# Patient Record
Sex: MALE | Race: WHITE | NOT HISPANIC OR LATINO | Employment: FULL TIME | URBAN - METROPOLITAN AREA
[De-identification: names, ages, dates, MRNs, and addresses within clinical notes are randomized per-mention and may not be internally consistent; named-entity substitution may affect disease eponyms.]

---

## 2022-06-29 ENCOUNTER — LAB VISIT (OUTPATIENT)
Dept: LAB | Facility: HOSPITAL | Age: 27
End: 2022-06-29
Attending: UROLOGY

## 2022-06-29 DIAGNOSIS — E29.1 TESTICULAR FAILURE: Primary | ICD-10-CM

## 2022-06-29 DIAGNOSIS — E29.1 TESTICULAR FAILURE: ICD-10-CM

## 2022-06-29 LAB
ESTRADIOL SERPL-MCNC: 41 PG/ML (ref 11–44)
FSH SERPL-ACNC: 7.56 MIU/ML (ref 0.95–11.95)
LH SERPL-ACNC: 7.1 MIU/ML (ref 0.6–12.1)
PROLACTIN SERPL IA-MCNC: 13.7 NG/ML (ref 3.5–19.4)
TESTOST SERPL-MCNC: 824 NG/DL (ref 304–1227)

## 2022-06-29 PROCEDURE — 84403 ASSAY OF TOTAL TESTOSTERONE: CPT | Performed by: UROLOGY

## 2022-06-29 PROCEDURE — 84146 ASSAY OF PROLACTIN: CPT | Performed by: UROLOGY

## 2022-06-29 PROCEDURE — 83002 ASSAY OF GONADOTROPIN (LH): CPT | Performed by: UROLOGY

## 2022-06-29 PROCEDURE — 82670 ASSAY OF TOTAL ESTRADIOL: CPT | Performed by: UROLOGY

## 2022-06-29 PROCEDURE — 36415 COLL VENOUS BLD VENIPUNCTURE: CPT | Performed by: UROLOGY

## 2022-06-29 PROCEDURE — 83001 ASSAY OF GONADOTROPIN (FSH): CPT | Performed by: UROLOGY

## 2022-07-06 ENCOUNTER — LAB VISIT (OUTPATIENT)
Dept: LAB | Facility: HOSPITAL | Age: 27
End: 2022-07-06
Attending: UROLOGY

## 2022-07-06 ENCOUNTER — OFFICE VISIT (OUTPATIENT)
Dept: UROLOGY | Facility: CLINIC | Age: 27
End: 2022-07-06

## 2022-07-06 ENCOUNTER — PATIENT MESSAGE (OUTPATIENT)
Dept: UROLOGY | Facility: CLINIC | Age: 27
End: 2022-07-06

## 2022-07-06 VITALS
HEIGHT: 67 IN | DIASTOLIC BLOOD PRESSURE: 83 MMHG | WEIGHT: 168.63 LBS | BODY MASS INDEX: 26.47 KG/M2 | HEART RATE: 99 BPM | SYSTOLIC BLOOD PRESSURE: 121 MMHG

## 2022-07-06 DIAGNOSIS — Z11.3 SCREENING EXAMINATION FOR STD (SEXUALLY TRANSMITTED DISEASE): ICD-10-CM

## 2022-07-06 DIAGNOSIS — Q55.4 BILATERAL CONGENITAL ABSENCE OF VAS DEFERENS: ICD-10-CM

## 2022-07-06 DIAGNOSIS — E29.1 TESTICULAR FAILURE: Primary | ICD-10-CM

## 2022-07-06 DIAGNOSIS — E29.1 TESTICULAR FAILURE: ICD-10-CM

## 2022-07-06 LAB
C TRACH DNA SPEC QL NAA+PROBE: NOT DETECTED
N GONORRHOEA DNA SPEC QL NAA+PROBE: NOT DETECTED

## 2022-07-06 PROCEDURE — 86803 HEPATITIS C AB TEST: CPT | Performed by: UROLOGY

## 2022-07-06 PROCEDURE — 87340 HEPATITIS B SURFACE AG IA: CPT | Performed by: UROLOGY

## 2022-07-06 PROCEDURE — 99213 OFFICE O/P EST LOW 20 MIN: CPT | Mod: PBBFAC | Performed by: UROLOGY

## 2022-07-06 PROCEDURE — 86705 HEP B CORE ANTIBODY IGM: CPT | Performed by: UROLOGY

## 2022-07-06 PROCEDURE — 81220 CFTR GENE COM VARIANTS: CPT | Performed by: UROLOGY

## 2022-07-06 PROCEDURE — 99205 OFFICE O/P NEW HI 60 MIN: CPT | Mod: S$PBB,,, | Performed by: UROLOGY

## 2022-07-06 PROCEDURE — 36415 COLL VENOUS BLD VENIPUNCTURE: CPT | Performed by: UROLOGY

## 2022-07-06 PROCEDURE — 87389 HIV-1 AG W/HIV-1&-2 AB AG IA: CPT | Performed by: UROLOGY

## 2022-07-06 PROCEDURE — 99999 PR PBB SHADOW E&M-EST. PATIENT-LVL III: ICD-10-PCS | Mod: PBBFAC,,, | Performed by: UROLOGY

## 2022-07-06 PROCEDURE — 99999 PR PBB SHADOW E&M-EST. PATIENT-LVL III: CPT | Mod: PBBFAC,,, | Performed by: UROLOGY

## 2022-07-06 PROCEDURE — 87591 N.GONORRHOEAE DNA AMP PROB: CPT | Performed by: UROLOGY

## 2022-07-06 PROCEDURE — 87491 CHLMYD TRACH DNA AMP PROBE: CPT | Performed by: UROLOGY

## 2022-07-06 PROCEDURE — 99205 PR OFFICE/OUTPT VISIT, NEW, LEVL V, 60-74 MIN: ICD-10-PCS | Mod: S$PBB,,, | Performed by: UROLOGY

## 2022-07-06 PROCEDURE — 86592 SYPHILIS TEST NON-TREP QUAL: CPT | Performed by: UROLOGY

## 2022-07-06 NOTE — LETTER
July 6, 2022        Regina Castanon MD  [address] [place] [address] Blvd  [address]  [facility]  [place] [address]             [facility] 4th Fl  [address]  [facility] [address]  Phone: [phone]   Patient: Eve Singh   MR Number: [id number]   YOB: 1995   Date of Visit: 7/6/2022       Dear Dr. Castanon:    Thank you for referring Eve Singh to me for evaluation. Attached you will find relevant portions of my assessment and plan of care.    If you have questions, please do not hesitate to call me. I look forward to following Eve Singh along with you.    Sincerely,      Joey Gregorio MD            CC  No Recipients    Enclosure

## 2022-07-06 NOTE — PROGRESS NOTES
"Chief Complaint:  Infertility    HPI:    Mr. Singh is a 26 y.o.  male who has been  to his wife for the past 1 years. They have been trying to achieve a pregnancy for the past 1 years but without success. Eve Singh has  undergone a semen analysis x 1 showing azoospermia and a low volume. He denies a history of erectile dysfunction and ejaculatory problems.    He has achieved 0 pregnancies in the past.      Lab Results   Component Value Date    TOTALTESTOST 824 2022    LABLH 7.1 2022    FSH 7.56 2022    ESTRADIOL 41 2022    PROLACTIN 13.7 2022      SA 22 (ANGELY)--0.6 cc/azoospermia pH--6.4    Marbin Christina is 21 years old. ( 3/14/2001) Her menses are regular. She has undergone prior hysterosalpingogram. This was normal. She has achieved 0 prior pregnancies.  She sees Dr. Castanon.    The couple has not undergone prior intrauterine insemination procedures.    The couple has not undergone prior in-vitro fertilization procedures.    Eve Singh denies a history of exposure to harmful chemicals, toxins, and radiation.    No history of recent fevers greater than 101.5 degrees Farenheit.    No history of recent exposure to "wet heat."    No history of urological trauma or testicular torsion.    No history of prostatitis, epididymitis, and orchitis.    No history of post-pubertal mumps.    There is no known family history of fertility problems.    REVIEW OF SYSTEMS:     He denies headache, blurred vision, fever, nausea, vomiting, chills, abdominal pain, chest pain, sore throat, bleeding per rectum, cough, SOB, recent loss of consciousness, recent mental status changes, seizures, dizziness, or upper or lower extremity weakness.    PHYSICAL EXAM:     The patient generally appears in good health, is appropriately interactive, and is in no apparent distress.     Eyes: anicteric sclerae, moist conjunctivae; no lid-lag; PERRLA     HENT: Atraumatic; oropharynx clear with moist " mucous membranes and no mucosal ulcerations;normal hard and soft palate.  No evidence of lymphadenopathy.    Neck: Trachea midline.  No thyromegaly.    Skin: No lesions.    Mental: Cooperative with normal affect.  Is oriented to time, place, and person.    Neuro: Grossly intact.    Chest: Normal inspiratory effort.   No accessory muscles.  No audible wheezes.  Respirations symmetric on inspiration and expiration.    Heart: Regular rhythm.      Abdomen:  Soft, non-tender. No masses or organomegaly. Bladder is not palpable. No evidence of flank discomfort. No evidence of inguinal hernia.    Genitourinary: Penis is normal with no evidence of plaques or induration. Urethral meatus is normal. Scrotum is normal. Testes are descended bilaterally with no evidence of abnormal masses or tenderness. Testicular volume is approximately 18 cc bilaterally.  He has absence of the vas bilaterally.    Extremities: No cyanosis, clubbing, or edema.    IMPRESSION & PLAN:    Mr. Singh is a 26 y.o.  male who has been  to his wife for the past 1 years. They have been trying to achieve a pregnancy for the past 1 years but without success. Eve Singh has  undergone a semen analysis x 1 showing azoospermia and a low volume. He denies a history of erectile dysfunction and ejaculatory problems.    He has achieved 0 pregnancies in the past.      Lab Results   Component Value Date    TOTALTESTOST 824 06/29/2022    LABLH 7.1 06/29/2022    FSH 7.56 06/29/2022    ESTRADIOL 41 06/29/2022    PROLACTIN 13.7 06/29/2022      SA 6/20/22 (ANGELY)--0.6 cc/azoospermia pH--6.4    He has absence of the vas bilaterally.    1.  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  2. Discussed that he has absence of the vas bilaterally.  Recommend CF testing.  3.  Recommend TESE. Risks and benefits of epididymal sperm aspiration/TESE  were discussed in detail today including failure to retrieve  "sperm, infection, bleeding, hypogonadism, pain, loss of testicle, need for IVF with subsequent failure to achieve a pregnancy.     4.  Recommend avoiding "wet heat."  5.  Recommend taking a multivitamin and 500 mg of vitamin c daily in addition to the multivitamin.  6.  Please send a copy of the note to Dr. Castanon.  Thank you for the consultation.  7.  Recommend his wife be evaluated for IVF.  8. H&P was done with Teresa  today.    CC: Kina      "

## 2022-07-07 ENCOUNTER — PATIENT MESSAGE (OUTPATIENT)
Dept: UROLOGY | Facility: CLINIC | Age: 27
End: 2022-07-07

## 2022-07-07 LAB
HBV CORE IGM SERPL QL IA: NEGATIVE
HBV SURFACE AG SERPL QL IA: NEGATIVE
HCV AB SERPL QL IA: NEGATIVE
HIV 1+2 AB+HIV1 P24 AG SERPL QL IA: NEGATIVE
RPR SER QL: NORMAL

## 2022-07-19 LAB
CFTR MUT ANL BLD/T: NEGATIVE
CFTR MUT ANL BLD/T: NORMAL
CFTR MUT TESTED BLD/T: NORMAL
GENETICIST REVIEW: NORMAL
REF LAB TEST METHOD: NORMAL

## 2022-08-10 ENCOUNTER — PATIENT MESSAGE (OUTPATIENT)
Dept: UROLOGY | Facility: CLINIC | Age: 27
End: 2022-08-10